# Patient Record
Sex: MALE | Race: BLACK OR AFRICAN AMERICAN | NOT HISPANIC OR LATINO | URBAN - METROPOLITAN AREA
[De-identification: names, ages, dates, MRNs, and addresses within clinical notes are randomized per-mention and may not be internally consistent; named-entity substitution may affect disease eponyms.]

---

## 2021-12-28 ENCOUNTER — HISTORICAL (OUTPATIENT)
Dept: ADMINISTRATIVE | Facility: HOSPITAL | Age: 61
End: 2021-12-28

## 2021-12-28 LAB — SARS-COV-2 RNA RESP QL NAA+PROBE: NOT DETECTED

## 2023-08-03 ENCOUNTER — HOSPITAL ENCOUNTER (EMERGENCY)
Facility: HOSPITAL | Age: 63
Discharge: HOME | End: 2023-08-03
Attending: EMERGENCY MEDICINE
Payer: MEDICAID

## 2023-08-03 VITALS
HEIGHT: 70 IN | RESPIRATION RATE: 20 BRPM | DIASTOLIC BLOOD PRESSURE: 80 MMHG | BODY MASS INDEX: 27.2 KG/M2 | WEIGHT: 190 LBS | OXYGEN SATURATION: 97 % | SYSTOLIC BLOOD PRESSURE: 146 MMHG | HEART RATE: 64 BPM | TEMPERATURE: 98 F

## 2023-08-03 DIAGNOSIS — S80.11XA HEMATOMA OF RIGHT LOWER LEG: Primary | ICD-10-CM

## 2023-08-03 DIAGNOSIS — S50.01XA CONTUSION OF RIGHT ELBOW, INITIAL ENCOUNTER: ICD-10-CM
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
## 2023-08-03 PROCEDURE — 99284 EMERGENCY DEPT VISIT MOD MDM: CPT

## 2023-08-04 NOTE — ED PROVIDER NOTES
Encounter Date: 8/3/2023       History     Chief Complaint   Patient presents with    Fall     Fell off ladder at 1945-7 feet-no loss of consciousness-ambulatory-pain right elbow and right lower leg     62-year-old male states that he fell off of the top rung of a ladder just prior to arrival.  He has a video on his phone showing him rolling across the grass after he fell.  He complains of a small cut to the right anterior shin which he states he has cleaned and a hematoma to this area.  He also complains of bruising and swelling to the right elbow.  He is ambulatory without assistance.  He has no pain to his head, neck, back, chest, abdomen, pelvis.  The only places where he is hurting his the right anterior shin in the right elbow.  He does not take blood thinners.        Review of patient's allergies indicates:  No Known Allergies  No past medical history on file.  No past surgical history on file.  History reviewed. No pertinent family history.     Review of Systems   Musculoskeletal:  Positive for arthralgias.   All other systems reviewed and are negative.      Physical Exam     Initial Vitals [08/03/23 2038]   BP Pulse Resp Temp SpO2   (!) 146/80 64 20 98 °F (36.7 °C) 97 %      MAP       --         Physical Exam    Nursing note and vitals reviewed.  Constitutional: Vital signs are normal. He appears well-developed and well-nourished.   HENT:   Head: Normocephalic and atraumatic.   Mouth/Throat: Oropharynx is clear and moist.   Eyes: Pupils are equal, round, and reactive to light.   Neck: Neck supple. No JVD present.   Nontender to the spine with full range of motion of the neck without pain   Cardiovascular:  Normal rate, regular rhythm and normal heart sounds.           Pulmonary/Chest: Breath sounds normal. No respiratory distress.   Abdominal: Abdomen is soft. There is no abdominal tenderness.   Musculoskeletal:         General: No edema.      Cervical back: Neck supple. No edema or erythema.      Comments:  Bruising noted to the right elbow but it has full range of motion, no laceration abrasion, neurovascular intact.  Bruising and swelling noted to the right anterior shin with a small abrasion, area is somewhat soft like a developing hematoma, ice pack was applied.  The rest of his extremity exam is completely benign.     Lymphadenopathy:     He has no cervical adenopathy.   Neurological: He is alert and oriented to person, place, and time. No cranial nerve deficit. GCS score is 15. GCS eye subscore is 4. GCS verbal subscore is 5. GCS motor subscore is 6.   Skin: Skin is warm and dry. Capillary refill takes less than 2 seconds.   Psychiatric: He has a normal mood and affect. Thought content normal.         ED Course   Procedures  Labs Reviewed - No data to display       Imaging Results              X-Ray Tibia Fibula 2 View Right (Final result)  Result time 08/03/23 22:00:34      Final result by Nabil Poole MD (08/03/23 22:00:34)                   Impression:      No acute osseous abnormality identified.      Electronically signed by: Nabil Poole  Date:    08/03/2023  Time:    22:00               Narrative:    EXAMINATION:  XR TIBIA FIBULA 2 VIEW RIGHT    CLINICAL HISTORY:  Unspecified fall, initial encounter    TECHNIQUE:  Two-view    COMPARISON:  None available    FINDINGS:  Articular surfaces alignment is unremarkable.  No acute fracture or dislocation identified.                                       X-Ray Elbow Complete Right (Final result)  Result time 08/03/23 21:59:44      Final result by Nabil Poole MD (08/03/23 21:59:44)                   Impression:      No acute osseous abnormality identified.      Electronically signed by: Nabil Poole  Date:    08/03/2023  Time:    21:59               Narrative:    EXAMINATION:  Right elbow    CLINICAL HISTORY:  Unspecified fall, initial encounter    TECHNIQUE:  Three views.    COMPARISON:  None available.    FINDINGS:  Right elbow articular surfaces are  unremarkable and there is no intrinsic osseous abnormality.  There is no acute fracture, dislocation or arthritic change.  Position and alignment is satisfactory.  There is unremarkable mineralization of the bones.  No soft tissue calcifications.                                    X-Rays:   Independently Interpreted Readings:   Other Readings:  Preliminary reading of the right tib-fib x-ray and right elbow x-ray is negative for acute injury    Medications - No data to display  Medical Decision Making:   Initial Assessment:   62-year-old male states that he fell off of the top rung of a ladder just prior to arrival.  He has a video on his phone showing him rolling across the grass after he fell.  He complains of a small cut to the right anterior shin which he states he has cleaned and a hematoma to this area.  He also complains of bruising and swelling to the right elbow.  He is ambulatory without assistance.  He has no pain to his head, neck, back, chest, abdomen, pelvis.  The only places where he is hurting his the right anterior shin in the right elbow.  He does not take blood thinners.        Differential Diagnosis:   Differential diagnosis includes but is not limited to abrasion, contusion, sprain, fracture  ED Management:  Patient was seen and evaluated in the Emergency Department with history, physical exam and x-rays of his right tib-fib and right elbow which the areas of pain.  His x-rays are benign.  The risks of his physical exam is normal.  In particular, he has no pain in his head neck back chest abdomen or pelvis.  He is stable for discharge home to follow-up with his PCP as needed.  Abrasion to the right shin was cleaned and a Band-Aid was applied.                          Clinical Impression:   Final diagnoses:  [W19.XXXA] Fall  [S80.11XA] Hematoma of right lower leg (Primary)  [S50.01XA] Contusion of right elbow, initial encounter        ED Disposition Condition    Discharge Stable          ED  Prescriptions    None       Follow-up Information       Follow up With Specialties Details Why Contact Info    PCP   As needed              Donna Gallardo MD  08/04/23 [id number]

## 2023-08-04 NOTE — DISCHARGE INSTRUCTIONS
Tylenol or ibuprofen as needed for pain.  Your Xrays were read on a preliminary basis in the radiologist will read the films tomorrow.  If something is seen tomorrow that we did not see today, you will be notified.

## 2023-09-07 ENCOUNTER — PATIENT MESSAGE (OUTPATIENT)
Dept: RESEARCH | Facility: HOSPITAL | Age: 63
End: 2023-09-07
Payer: MEDICAID